# Patient Record
Sex: MALE | Race: WHITE | NOT HISPANIC OR LATINO | URBAN - METROPOLITAN AREA
[De-identification: names, ages, dates, MRNs, and addresses within clinical notes are randomized per-mention and may not be internally consistent; named-entity substitution may affect disease eponyms.]

---

## 2025-05-12 ENCOUNTER — OFFICE VISIT (OUTPATIENT)
Dept: URGENT CARE | Age: 60
End: 2025-05-12
Payer: COMMERCIAL

## 2025-05-12 VITALS
OXYGEN SATURATION: 93 % | HEART RATE: 77 BPM | SYSTOLIC BLOOD PRESSURE: 133 MMHG | TEMPERATURE: 98.4 F | DIASTOLIC BLOOD PRESSURE: 78 MMHG | RESPIRATION RATE: 17 BRPM

## 2025-05-12 DIAGNOSIS — J02.9 SORE THROAT: ICD-10-CM

## 2025-05-12 LAB
POC HUMAN RHINOVIRUS PCR: NEGATIVE
POC INFLUENZA A VIRUS PCR: NEGATIVE
POC INFLUENZA B VIRUS PCR: NEGATIVE
POC RESPIRATORY SYNCYTIAL VIRUS PCR: NEGATIVE
POC STREPTOCOCCUS PYOGENES (GROUP A STREP) PCR: NEGATIVE

## 2025-05-12 PROCEDURE — 87651 STREP A DNA AMP PROBE: CPT

## 2025-05-12 PROCEDURE — 87631 RESP VIRUS 3-5 TARGETS: CPT

## 2025-05-12 PROCEDURE — 99203 OFFICE O/P NEW LOW 30 MIN: CPT

## 2025-05-12 PROCEDURE — 99070 SPECIAL SUPPLIES PHYS/QHP: CPT

## 2025-05-12 RX ORDER — PREDNISONE 20 MG/1
40 TABLET ORAL DAILY
Qty: 10 TABLET | Refills: 0 | Status: SHIPPED | OUTPATIENT
Start: 2025-05-12 | End: 2025-05-17

## 2025-05-12 ASSESSMENT — ENCOUNTER SYMPTOMS: SORE THROAT: 1

## 2025-05-12 NOTE — PATIENT INSTRUCTIONS
You were seen at Urgent Care today for sore throat. Please treat as discussed. Please take medications as prescribed. Monitor for red flags which we spoke about, If your symptoms change, worsen or become concerning in any way, please go to the emergency room immediately, otherwise you can followup with your PCP in 2-3 days as needed

## 2025-05-12 NOTE — PROGRESS NOTES
Subjective   Patient ID: Gennadi Telerman is a 59 y.o. male. They present today with a chief complaint of Sore Throat (Sore throat x 1 week.).    History of Present Illness  Patient is a 59-year-old male with history of hypertension presents urgent care today with a complaint of sore throat.  He states his symptoms started approximately 1 week ago.  He has been taking Advil without significant relief.  He denies any fevers, difficulty swallowing, shortness of breath or facial swelling.  No other complaints or concerns at this time.      History provided by:  Patient  Sore Throat         Past Medical History  Allergies as of 05/12/2025    (No Known Allergies)       Prescriptions Prior to Admission[1]       Medical History[2]    Surgical History[3]         Review of Systems  Review of Systems   HENT:  Positive for sore throat.                                   Objective    Vitals:    05/12/25 1618   BP: 133/78   BP Location: Left arm   Patient Position: Sitting   BP Cuff Size: Large adult   Pulse: 77   Resp: 17   Temp: 36.9 °C (98.4 °F)   TempSrc: Oral   SpO2: 93%     No LMP for male patient.    Physical Exam  Vitals and nursing note reviewed.   Constitutional:       General: He is not in acute distress.     Appearance: Normal appearance. He is not ill-appearing, toxic-appearing or diaphoretic.   HENT:      Head: Normocephalic and atraumatic.      Mouth/Throat:      Lips: Pink.      Mouth: Mucous membranes are moist. Injury and lacerations present. No oral lesions or angioedema.      Tongue: No lesions. Tongue does not deviate from midline.      Palate: No mass and lesions.      Pharynx: Oropharynx is clear. Uvula midline. Posterior oropharyngeal erythema and postnasal drip present. No oropharyngeal exudate or uvula swelling.      Tonsils: No tonsillar exudate or tonsillar abscesses.   Eyes:      Extraocular Movements: Extraocular movements intact.      Conjunctiva/sclera: Conjunctivae normal.      Pupils: Pupils are  equal, round, and reactive to light.   Cardiovascular:      Rate and Rhythm: Normal rate and regular rhythm.      Pulses: Normal pulses.      Heart sounds: Normal heart sounds.   Pulmonary:      Effort: Pulmonary effort is normal. No respiratory distress.      Breath sounds: Normal breath sounds. No stridor. No wheezing, rhonchi or rales.   Chest:      Chest wall: No tenderness.   Musculoskeletal:         General: Normal range of motion.      Cervical back: Normal range of motion and neck supple.   Skin:     General: Skin is warm and dry.      Capillary Refill: Capillary refill takes less than 2 seconds.   Neurological:      General: No focal deficit present.      Mental Status: He is alert and oriented to person, place, and time.   Psychiatric:         Mood and Affect: Mood normal.         Behavior: Behavior normal.         Procedures      Assessment/Plan   Allergies, medications, history, and pertinent labs/EKGs/Imaging reviewed by MARY Bryan.     Medical Decision Making    Patient is well appearing, afebrile, non toxic, not hypoxic, and appropriate for outpatient treatment and management at time of evaluation. Patient presents with ongoing sore throat x 1 week.     Differential includes but not limited to: Streptococcal pharyngitis, viral pharyngitis, postnasal drip, other    On exam, patient has bilateral erythema of the oropharynx without appreciable tonsillar swelling or exudate.  No clinical signs of peritonsillar abscess or Gilbert degenerative.  No appreciable cervical adenopathy.  He is afebrile and appears well-hydrated.  Vitals within normal limits.  Oxygen saturation on room air is 94%.      Strep PCR is negative.  Rapid rhinovirus, RSV and influenza are still negative.  Suspect viral etiology.    Patient provided with a prescription for prednisone to use as directed.  Also recommended continued use of over-the-counter medication as needed for symptom relief.    Counseling: Spoke with the  patient and discussed today´s findings, in addition to providing specific details for the plan of care and expected course.  Patient was given the opportunity to ask questions.     Discussed return precautions and importance of follow-up. Advised to follow-up with PCP.  We spoke at length regarding the red flags he/she should be aware of and monitor for.  I specifically advised to go to the ED for changing or worsening symptoms, new symptoms, complaint specific precautions, and precautions listed on the discharge paperwork.    The plan of care was mutually agreed upon with the patient. All questions and concerns were answered to the best of my ability with today's information.  Patient was discharged in stable condition.    Dictation software was used in the creation of this note which does not evaluate or correct for typographical, spelling, syntax or grammatical errors.    Orders and Diagnoses  Diagnoses and all orders for this visit:  Sore throat  -     POCT SPOTFIRE R/ST Panel Mini w/Strep A ([facility]) manually resulted  -     predniSONE (Deltasone) 20 mg tablet; Take 2 tablets (40 mg) by mouth once daily for 5 days.      Medical Admin Record      Follow Up Instructions  No follow-ups on file.    Patient disposition: Home    Electronically signed by MARY Bryan  4:38 PM       [1] (Not in a hospital admission)  [2]   Past Medical History:  Diagnosis Date    Other conditions influencing health status 11/02/2017    History of cough   [3] No past surgical history on file.

## 2025-05-13 ENCOUNTER — TELEPHONE (OUTPATIENT)
Dept: URGENT CARE | Age: 60
End: 2025-05-13

## 2025-05-15 ENCOUNTER — HOSPITAL ENCOUNTER (EMERGENCY)
Facility: HOSPITAL | Age: 60
Discharge: HOME | End: 2025-05-16
Payer: COMMERCIAL

## 2025-05-15 ENCOUNTER — APPOINTMENT (OUTPATIENT)
Dept: RADIOLOGY | Facility: HOSPITAL | Age: 60
End: 2025-05-15
Payer: COMMERCIAL

## 2025-05-15 DIAGNOSIS — J02.9 PHARYNGITIS, UNSPECIFIED ETIOLOGY: Primary | ICD-10-CM

## 2025-05-15 DIAGNOSIS — R22.0 TONGUE SWELLING: ICD-10-CM

## 2025-05-15 PROBLEM — I10 HTN (HYPERTENSION): Status: ACTIVE | Noted: 2025-05-15

## 2025-05-15 PROBLEM — N40.1 BENIGN PROSTATIC HYPERPLASIA WITH LOWER URINARY TRACT SYMPTOMS: Status: ACTIVE | Noted: 2023-06-21

## 2025-05-15 PROBLEM — R35.1 BENIGN PROSTATIC HYPERPLASIA WITH NOCTURIA: Status: ACTIVE | Noted: 2025-05-15

## 2025-05-15 PROBLEM — E66.811 OBESITY, CLASS I, BMI 30-34.9: Status: ACTIVE | Noted: 2025-03-08

## 2025-05-15 PROBLEM — M79.2 NEURALGIA AND NEURITIS: Status: ACTIVE | Noted: 2025-05-15

## 2025-05-15 PROBLEM — G50.9 TRIGEMINAL NEUROPATHY: Status: ACTIVE | Noted: 2025-05-15

## 2025-05-15 PROBLEM — K62.5 RECTAL BLEEDING: Status: ACTIVE | Noted: 2025-05-15

## 2025-05-15 PROBLEM — R53.83 FATIGUE: Status: ACTIVE | Noted: 2025-05-15

## 2025-05-15 PROBLEM — R97.20 HIGH PROSTATE SPECIFIC ANTIGEN (PSA): Status: ACTIVE | Noted: 2023-06-21

## 2025-05-15 PROBLEM — N32.89 SPASTIC BLADDER: Status: ACTIVE | Noted: 2025-05-15

## 2025-05-15 PROBLEM — R14.0 ABDOMINAL BLOATING: Status: ACTIVE | Noted: 2025-05-15

## 2025-05-15 PROBLEM — R31.0 GROSS HEMATURIA: Status: ACTIVE | Noted: 2017-08-10

## 2025-05-15 PROBLEM — Z86.0100 HISTORY OF COLONIC POLYPS: Status: ACTIVE | Noted: 2025-05-15

## 2025-05-15 PROBLEM — J01.30 ACUTE NON-RECURRENT SPHENOIDAL SINUSITIS: Status: ACTIVE | Noted: 2025-05-15

## 2025-05-15 PROBLEM — N40.1 BENIGN PROSTATIC HYPERPLASIA WITH NOCTURIA: Status: ACTIVE | Noted: 2025-05-15

## 2025-05-15 PROBLEM — K64.4 EXTERNAL HEMORRHOID: Status: ACTIVE | Noted: 2017-08-10

## 2025-05-15 PROBLEM — G47.20 CIRCADIAN RHYTHM SLEEP DISORDER: Status: ACTIVE | Noted: 2025-05-15

## 2025-05-15 PROBLEM — E11.9 TYPE 2 DIABETES MELLITUS: Status: ACTIVE | Noted: 2025-05-15

## 2025-05-15 PROBLEM — N42.89 PROSTATE MASS: Status: ACTIVE | Noted: 2023-06-21

## 2025-05-15 LAB
ALBUMIN SERPL BCP-MCNC: 4.7 G/DL (ref 3.4–5)
ALP SERPL-CCNC: 48 U/L (ref 33–136)
ALT SERPL W P-5'-P-CCNC: 27 U/L (ref 10–52)
ANION GAP SERPL CALC-SCNC: 11 MMOL/L (ref 10–20)
AST SERPL W P-5'-P-CCNC: 22 U/L (ref 9–39)
BASOPHILS # BLD AUTO: 0.03 X10*3/UL (ref 0–0.1)
BASOPHILS NFR BLD AUTO: 0.2 %
BILIRUB SERPL-MCNC: 0.5 MG/DL (ref 0–1.2)
BUN SERPL-MCNC: 24 MG/DL (ref 6–23)
CALCIUM SERPL-MCNC: 9.5 MG/DL (ref 8.6–10.3)
CHLORIDE SERPL-SCNC: 106 MMOL/L (ref 98–107)
CO2 SERPL-SCNC: 26 MMOL/L (ref 21–32)
CREAT SERPL-MCNC: 1.1 MG/DL (ref 0.5–1.3)
EGFRCR SERPLBLD CKD-EPI 2021: 76 ML/MIN/1.73M*2
EOSINOPHIL # BLD AUTO: 0.31 X10*3/UL (ref 0–0.7)
EOSINOPHIL NFR BLD AUTO: 2.4 %
ERYTHROCYTE [DISTWIDTH] IN BLOOD BY AUTOMATED COUNT: 16 % (ref 11.5–14.5)
FLUAV RNA RESP QL NAA+PROBE: NOT DETECTED
FLUBV RNA RESP QL NAA+PROBE: NOT DETECTED
GLUCOSE SERPL-MCNC: 92 MG/DL (ref 74–99)
HCT VFR BLD AUTO: 43.8 % (ref 41–52)
HGB BLD-MCNC: 13.9 G/DL (ref 13.5–17.5)
IMM GRANULOCYTES # BLD AUTO: 0.06 X10*3/UL (ref 0–0.7)
IMM GRANULOCYTES NFR BLD AUTO: 0.5 % (ref 0–0.9)
LYMPHOCYTES # BLD AUTO: 3.73 X10*3/UL (ref 1.2–4.8)
LYMPHOCYTES NFR BLD AUTO: 29.4 %
MCH RBC QN AUTO: 23.2 PG (ref 26–34)
MCHC RBC AUTO-ENTMCNC: 31.7 G/DL (ref 32–36)
MCV RBC AUTO: 73 FL (ref 80–100)
MONOCYTES # BLD AUTO: 1.23 X10*3/UL (ref 0.1–1)
MONOCYTES NFR BLD AUTO: 9.7 %
NEUTROPHILS # BLD AUTO: 7.34 X10*3/UL (ref 1.2–7.7)
NEUTROPHILS NFR BLD AUTO: 57.8 %
NRBC BLD-RTO: 0 /100 WBCS (ref 0–0)
PLATELET # BLD AUTO: 349 X10*3/UL (ref 150–450)
POTASSIUM SERPL-SCNC: 4 MMOL/L (ref 3.5–5.3)
PROT SERPL-MCNC: 6.9 G/DL (ref 6.4–8.2)
RBC # BLD AUTO: 5.99 X10*6/UL (ref 4.5–5.9)
RSV RNA RESP QL NAA+PROBE: NOT DETECTED
S PYO DNA THROAT QL NAA+PROBE: NOT DETECTED
SARS-COV-2 RNA RESP QL NAA+PROBE: NOT DETECTED
SODIUM SERPL-SCNC: 139 MMOL/L (ref 136–145)
WBC # BLD AUTO: 12.7 X10*3/UL (ref 4.4–11.3)

## 2025-05-15 PROCEDURE — 99285 EMERGENCY DEPT VISIT HI MDM: CPT | Mod: 25

## 2025-05-15 PROCEDURE — 80053 COMPREHEN METABOLIC PANEL: CPT

## 2025-05-15 PROCEDURE — 2500000004 HC RX 250 GENERAL PHARMACY W/ HCPCS (ALT 636 FOR OP/ED): Mod: JZ | Performed by: PHYSICIAN ASSISTANT

## 2025-05-15 PROCEDURE — 70491 CT SOFT TISSUE NECK W/DYE: CPT

## 2025-05-15 PROCEDURE — 87651 STREP A DNA AMP PROBE: CPT

## 2025-05-15 PROCEDURE — 2500000001 HC RX 250 WO HCPCS SELF ADMINISTERED DRUGS (ALT 637 FOR MEDICARE OP): Performed by: PHYSICIAN ASSISTANT

## 2025-05-15 PROCEDURE — 2550000001 HC RX 255 CONTRASTS

## 2025-05-15 PROCEDURE — 87637 SARSCOV2&INF A&B&RSV AMP PRB: CPT

## 2025-05-15 PROCEDURE — 96365 THER/PROPH/DIAG IV INF INIT: CPT

## 2025-05-15 PROCEDURE — 96375 TX/PRO/DX INJ NEW DRUG ADDON: CPT | Mod: 59

## 2025-05-15 PROCEDURE — 85025 COMPLETE CBC W/AUTO DIFF WBC: CPT

## 2025-05-15 PROCEDURE — 36415 COLL VENOUS BLD VENIPUNCTURE: CPT

## 2025-05-15 PROCEDURE — 70491 CT SOFT TISSUE NECK W/DYE: CPT | Performed by: STUDENT IN AN ORGANIZED HEALTH CARE EDUCATION/TRAINING PROGRAM

## 2025-05-15 RX ORDER — ONDANSETRON HYDROCHLORIDE 2 MG/ML
4 INJECTION, SOLUTION INTRAVENOUS ONCE
Status: COMPLETED | OUTPATIENT
Start: 2025-05-15 | End: 2025-05-15

## 2025-05-15 RX ORDER — OXYCODONE HCL 5 MG/5 ML
5 SOLUTION, ORAL ORAL EVERY 6 HOURS PRN
Qty: 60 ML | Refills: 0 | Status: SHIPPED | OUTPATIENT
Start: 2025-05-15 | End: 2025-05-18

## 2025-05-15 RX ORDER — DIPHENHYDRAMINE HCL 25 MG
25 CAPSULE ORAL ONCE
Status: COMPLETED | OUTPATIENT
Start: 2025-05-15 | End: 2025-05-15

## 2025-05-15 RX ORDER — CEFTRIAXONE 1 G/50ML
1 INJECTION, SOLUTION INTRAVENOUS ONCE
Status: COMPLETED | OUTPATIENT
Start: 2025-05-15 | End: 2025-05-15

## 2025-05-15 RX ORDER — MORPHINE SULFATE 4 MG/ML
4 INJECTION, SOLUTION INTRAMUSCULAR; INTRAVENOUS ONCE
Status: COMPLETED | OUTPATIENT
Start: 2025-05-15 | End: 2025-05-15

## 2025-05-15 RX ORDER — KETOROLAC TROMETHAMINE 30 MG/ML
15 INJECTION, SOLUTION INTRAMUSCULAR; INTRAVENOUS ONCE
Status: COMPLETED | OUTPATIENT
Start: 2025-05-15 | End: 2025-05-15

## 2025-05-15 RX ORDER — SODIUM CHLORIDE 9 MG/ML
0-150 INJECTION, SOLUTION INTRAVENOUS AS NEEDED
Status: DISCONTINUED | OUTPATIENT
Start: 2025-05-15 | End: 2025-05-16 | Stop reason: HOSPADM

## 2025-05-15 RX ADMIN — KETOROLAC TROMETHAMINE 15 MG: 30 INJECTION, SOLUTION INTRAMUSCULAR at 22:28

## 2025-05-15 RX ADMIN — ONDANSETRON 4 MG: 2 INJECTION, SOLUTION INTRAMUSCULAR; INTRAVENOUS at 22:28

## 2025-05-15 RX ADMIN — DIPHENHYDRAMINE HYDROCHLORIDE 25 MG: 25 CAPSULE ORAL at 22:28

## 2025-05-15 RX ADMIN — CEFTRIAXONE 1 G: 1 INJECTION, SOLUTION INTRAVENOUS at 22:27

## 2025-05-15 RX ADMIN — MORPHINE SULFATE 4 MG: 4 INJECTION, SOLUTION INTRAMUSCULAR; INTRAVENOUS at 22:30

## 2025-05-15 RX ADMIN — SODIUM CHLORIDE 1000 ML: 0.9 INJECTION, SOLUTION INTRAVENOUS at 22:27

## 2025-05-15 RX ADMIN — IOHEXOL 75 ML: 350 INJECTION, SOLUTION INTRAVENOUS at 20:24

## 2025-05-15 ASSESSMENT — PAIN - FUNCTIONAL ASSESSMENT: PAIN_FUNCTIONAL_ASSESSMENT: 0-10

## 2025-05-15 ASSESSMENT — PAIN DESCRIPTION - PAIN TYPE: TYPE: ACUTE PAIN

## 2025-05-15 ASSESSMENT — PAIN SCALES - GENERAL: PAINLEVEL_OUTOF10: 9

## 2025-05-15 ASSESSMENT — COLUMBIA-SUICIDE SEVERITY RATING SCALE - C-SSRS
6. HAVE YOU EVER DONE ANYTHING, STARTED TO DO ANYTHING, OR PREPARED TO DO ANYTHING TO END YOUR LIFE?: NO
2. HAVE YOU ACTUALLY HAD ANY THOUGHTS OF KILLING YOURSELF?: NO
1. IN THE PAST MONTH, HAVE YOU WISHED YOU WERE DEAD OR WISHED YOU COULD GO TO SLEEP AND NOT WAKE UP?: NO

## 2025-05-15 ASSESSMENT — PAIN DESCRIPTION - LOCATION: LOCATION: THROAT

## 2025-05-15 NOTE — Clinical Note
Maria Isabel Telerman was seen and treated in our emergency department on 5/15/2025.  He may return to work on 05/20/2025.       If you have any questions or concerns, please don't hesitate to call.      Cait Barbosa PA-C

## 2025-05-16 VITALS
SYSTOLIC BLOOD PRESSURE: 94 MMHG | RESPIRATION RATE: 20 BRPM | TEMPERATURE: 97.6 F | BODY MASS INDEX: 32.47 KG/M2 | HEART RATE: 60 BPM | DIASTOLIC BLOOD PRESSURE: 66 MMHG | HEIGHT: 73 IN | WEIGHT: 245 LBS | OXYGEN SATURATION: 97 %

## 2025-05-16 NOTE — DISCHARGE INSTRUCTIONS
Please continue cefdinir as previously prescribed.  Please continue Tylenol and/or ibuprofen as needed for pain.  Please stop taking Tylenol codeine.  May transition to Roxicodone.  Take this medication every 6 hours as needed for severe pain.  This is narcotic.  Do not drive, drink alcohol or operate heavy machinery while taking this medication.  Do not take other sedating medications when taking narcotics.  Narcotics can cause constipation.  Drink extra water and eat extra fiber to help prevent this. May take stool softners as needed.  May take Benadryl every 6 hours as needed for itching, tongue or lip swelling.  May take daily Claritin-D for the next few days.  Stop taking steroids.  Follow-up with primary care doctor  If you develop any new or worsening symptoms such as fever greater than 100.7 degrees Fahrenheit while on antibiotics, inability to swallow, worsening pain or swelling, low oxygen or anything else concerning to please go straight to [facility] ER where they have ENT coverage 24/7 and you can be seen by specialist and admitted if necessary.

## 2025-05-16 NOTE — ED PROVIDER NOTES
HPI   CC: Sore Throat     Patient is a 59-year-old male with benign PMH presenting to the ED with concern for a sore throat.  Symptoms started on Monday.  He has had a sore throat, hoarse voice, and tongue swelling.  Wife is providing history because it is too painful for pt to speak. Patient was seen at urgent care where they performed a point-of-care strep test which was negative.  Patient was given prednisone at that time for tongue swelling.  He has been taking the prednisone with no relief of symptoms.  On Wednesday patient went to his primary care who told him he does not know why he has a sore throat and gave him a prescription for cefdinir and Percocet.  Patient has been taking both those medications.  At that time patient was also given eardrops for possible left otitis externa which patient has not filled yet.  Symptoms continue to worsen and patient's PCP recommended he go to the ER to have a CT scan to assess for any abscesses.  He denies any fever, chills, drooling, nausea, vomiting, abdominal pain, diarrhea, neck pain/stiffness, chest pain, shortness of breath.  He has no known allergies or recent sick exposures.  Patient is of the pain is getting unbearable and it hurts to talk and he is unable to eat or drink secondary to pain.          ROS: 10-point review of systems was performed and is otherwise negative except as noted in HPI.    Limitations to history: N/A  Independent Historians: Self   External Records Reviewed: Outpatient notes in EMR    Past Medical History: Noncontributory except per HPI     Past Surgical History: Noncontributory except per HPI     Family History: Reviewed and noncontributory     Social History:  Denies tobacco. Denies ETOH. Denies illicit drugs.    RX Allergies[1]    Home Meds:   Current Outpatient Medications   Medication Instructions    oxyCODONE (ROXICODONE) 5 mg, oral, Every 6 hours PRN    predniSONE (DELTASONE) 40 mg, oral, Daily        Physical Exam     ED Triage  "Vitals [05/15/25 1824]   Temperature Heart Rate Respirations BP   36.4 °C (97.6 °F) 75 18 115/76      Pulse Ox Temp Source Heart Rate Source Patient Position   95 % Temporal Monitor --      BP Location FiO2 (%)     -- --         Vitals and nursing note reviewed.   Physical Exam  Constitutional:       General: He is not in acute distress.     Appearance: Normal appearance. He is not ill-appearing, toxic-appearing or diaphoretic.   HENT:      Head: Normocephalic and atraumatic.      Right Ear: Tympanic membrane and ear canal normal. No drainage, swelling or tenderness. No middle ear effusion. Tympanic membrane is not erythematous.      Left Ear: Tympanic membrane and ear canal normal. No drainage, swelling or tenderness.  No middle ear effusion. Tympanic membrane is not erythematous.      Nose: Nose normal. No congestion.      Mouth/Throat:      Mouth: Mucous membranes are moist.      Pharynx: Oropharynx is clear. Uvula midline. Posterior oropharyngeal erythema present. No oropharyngeal exudate.      Comments: No stridor, no trismus.  Patient is tolerating secretions without tripod positioning or drooling. Pt able to recite \"today is a addi day\" with no hot potato voice.   Eyes:      General: No scleral icterus.        Right eye: No discharge.         Left eye: No discharge.      Extraocular Movements: Extraocular movements intact.      Conjunctiva/sclera: Conjunctivae normal.      Pupils: Pupils are equal, round, and reactive to light.   Neck:      Comments: Patient has full range of motion to neck  Cardiovascular:      Rate and Rhythm: Normal rate and regular rhythm.      Heart sounds: Normal heart sounds. No murmur heard.     No friction rub. No gallop.   Pulmonary:      Effort: Pulmonary effort is normal. No respiratory distress.      Breath sounds: Normal breath sounds. No stridor. No wheezing, rhonchi or rales.   Abdominal:      General: Abdomen is flat. Bowel sounds are normal. There is no distension.      " Palpations: Abdomen is soft.      Tenderness: There is no abdominal tenderness. There is no right CVA tenderness, left CVA tenderness, guarding or rebound.   Musculoskeletal:         General: Normal range of motion.      Cervical back: Normal range of motion and neck supple. No rigidity or tenderness.   Lymphadenopathy:      Cervical: No cervical adenopathy.   Skin:     General: Skin is warm and dry.      Capillary Refill: Capillary refill takes less than 2 seconds.   Neurological:      General: No focal deficit present.      Mental Status: He is alert and oriented to person, place, and time.   Psychiatric:         Mood and Affect: Mood normal.         Behavior: Behavior normal.         Diagnostic Results      Labs Reviewed   CBC WITH AUTO DIFFERENTIAL - Abnormal       Result Value    WBC 12.7 (*)     nRBC 0.0      RBC 5.99 (*)     Hemoglobin 13.9      Hematocrit 43.8      MCV 73 (*)     MCH 23.2 (*)     MCHC 31.7 (*)     RDW 16.0 (*)     Platelets 349      Neutrophils % 57.8      Immature Granulocytes %, Automated 0.5      Lymphocytes % 29.4      Monocytes % 9.7      Eosinophils % 2.4      Basophils % 0.2      Neutrophils Absolute 7.34      Immature Granulocytes Absolute, Automated 0.06      Lymphocytes Absolute 3.73      Monocytes Absolute 1.23 (*)     Eosinophils Absolute 0.31      Basophils Absolute 0.03     COMPREHENSIVE METABOLIC PANEL - Abnormal    Glucose 92      Sodium 139      Potassium 4.0      Chloride 106      Bicarbonate 26      Anion Gap 11      Urea Nitrogen 24 (*)     Creatinine 1.10      eGFR 76      Calcium 9.5      Albumin 4.7      Alkaline Phosphatase 48      Total Protein 6.9      AST 22      Bilirubin, Total 0.5      ALT 27     GROUP A STREPTOCOCCUS, PCR - Normal    Group A Strep PCR Not Detected     SARS-COV-2, INFLUENZA A/B AND RSV PCR - Normal    Coronavirus 2019, PCR Not Detected      Flu A Result Not Detected      Flu B Result Not Detected      RSV PCR Not Detected      Narrative:      This assay is an FDA-cleared, in vitro diagnostic nucleic acid amplification test for the qualitative detection and differentiation of SARS CoV-2/ Influenza A/B/ RSV from nasopharyngeal specimens collected from individuals with signs and symptoms of respiratory tract infections, and has been validated for use at [facility]. Negative results do not preclude COVID-19/ Influenza A/B/ RSV infections and should not be used as the sole basis for diagnosis, treatment, or other management decisions. Testing for SARS CoV-2 is recommended only for patients who meet current clinical and/or epidemiological criteria defined by federal, state, or local public health directives.         CT soft tissue neck w IV contrast   Final Result   1. Evaluation of the oral cavity is significantly degraded by beam   hardening artifact from patient's dental hardware. Within limits of   the study, there is suggestion of some swelling/edema present in the   lips, which may represent component of angioedema with mild   prominence of the mucosal pharyngeal space in the oropharynx, which   may reflect component of pharyngitis, and further correlation with   physical exam is recommended. No associated lymphadenopathy, fluid   collections or fat stranding is present in the deep fat planes/spaces   of the neck.   2. Mild upper lung predominant paraseptal and centrilobular   emphysematous changes.   3. Multilevel degenerative changes of the cervical spine, with mild   spinal canal narrowing suspected at C5-C6 and C6-C7 due to disc   osteophyte complexes.        MACRO:   None        Signed by: Philip Zayas 5/15/2025 9:28 PM   Dictation workstation:   [id number]          ED Course & MDM   Assessment/Plan:   Medications   iohexol (OMNIPaque) 350 mg iodine/mL solution 75 mL (75 mL intravenous Given 5/15/25 2024)   sodium chloride 0.9 % bolus 1,000 mL (0 mL intravenous Stopped 5/15/25 2317)   cefTRIAXone (Rocephin) 1 g in  dextrose (iso) IV 50 mL (0 g intravenous Stopped 5/15/25 2317)   diphenhydrAMINE (BENADryl) capsule 25 mg (25 mg oral Given 5/15/25 2228)   morphine injection 4 mg (4 mg intravenous Given 5/15/25 2230)   ondansetron (Zofran) injection 4 mg (4 mg intravenous Given 5/15/25 2228)   ketorolac (Toradol) injection 15 mg (15 mg intravenous Given 5/15/25 2228)      ED Course as of 05/16/25 1925   Thu May 15, 2025   2054 Patient signed out to me by the SILVERIO Velez pending strep swab and soft tissue neck.  Patient presented to the ED today complaining of sore throat, tongue swelling.  Physical exam not concerning for ludwigs.  Urgent care earlier this week had rapid strep negative. Sent home on prednisone. Wed went to PCP. Gave cefdinir and percocet and drops for outer ear infection. Pcp said come to ED for imaging if symptoms do not improve.  [KA]   2144 IMPRESSION:  1. Evaluation of the oral cavity is significantly degraded by beam  hardening artifact from patient's dental hardware. Within limits of  the study, there is suggestion of some swelling/edema present in the  lips, which may represent component of angioedema with mild  prominence of the mucosal pharyngeal space in the oropharynx, which  may reflect component of pharyngitis, and further correlation with  physical exam is recommended. No associated lymphadenopathy, fluid  collections or fat stranding is present in the deep fat planes/spaces  of the neck.  2. Mild upper lung predominant paraseptal and centrilobular  emphysematous changes.  3. Multilevel degenerative changes of the cervical spine, with mild  spinal canal narrowing suspected at C5-C6 and C6-C7 due to disc  osteophyte complexes.   [KA]   2145 I personally viewed labs.  CBC shows leukocytosis WBCs 12.7.  Normal hemoglobin and no left shift.  CMP shows normal electrolytes, slightly elevated BUN otherwise normal renal function, normal LFTs.  COVID flu and strep negative. [KA]   2206 On reassessment  patient still not talking.  Wife is providing history.  She says it is too painful for him to talk.  Complaining of tongue swelling.  On my exam oropharynx is patent.  Do not appreciate any Gilbert's angina.  Tongue appears normal.  Lips appear normal.  No evidence of angioedema.  He does not take lisinopril or other ACE.  He said the symptoms started before he took the Tylenol with codeine and he has taken Tylenol with codeine in the past without similar symptoms.  He is requesting medication for pain.  Will give 4 mg of IV morphine, 15 mg IV Toradol and 4 mg IV Zofran.  Wife is concerned because he has not eaten and drinking a lot today and she thinks he may become dehydrated.  Will order 1 L normal saline.  He did not take his dose of antibiotics tonight because he was here and because he is having difficulty eating and he does not want to take his antibiotics on empty stomach.  Will give 1 dose of IV Rocephin in the ED.  Patient also to be given dose of p.o. Benadryl.  Will reassess. [KA]   2332 Walking pulse ox patient dropped to 92% on room air.  Improved to 95% on room air at rest. [KA]   2356 On reassessment, patient doing much better.  He is able to talk.  He said pain is improved.  He is now feeling hungry.  Reviewed OARRS with no concerning findings.  Will transition to liquid oxycodone advised not to take any more Tylenol with codeine.  Advised to continue antibiotics as previously prescribed.  Advised to stop taking steroids.  Recommended follow-up with PCP and returning to ED for any new or worsening symptoms.  Also recommended he speak to PCP about sleep study.  Patient and wife agreeable.  Provided with work note. [KA]   Fri May 16, 2025   1921 Patient is a 59-year-old male presenting to the ED with concern for sore throat and tongue swelling.  Vital signs are stable, patient is nontoxic-appearing.  On exam there is no obvious swelling in the oropharynx.  There is no trismus or tripoding.  Patient has  full range of motion to neck with no lymphadenopathy or signs of meningismus.  Patient states it is too painful for him to talk, however when requesting patient to speak he has normal phonation with no hot potato voice.  Given this is his third visit for the same complaint with negative viral and strep swabs in the past will obtain CT soft tissue neck to evaluate for any abscesses.  Blood work was obtained.  CBC with leukocytosis at 12.7, no signs of anemia, normal platelets.  CMP with no electrolyte abnormalities and normal kidney function.  BUN elevated at 24.  Swabs negative for COVID, flu, RSV, strep.  Patient was signed out to Berta Barbosa PA-C at 9 PM pending results of CT soft tissue neck imaging.  I anticipate patient will likely need antibiotics, however will defer to Berta Barbosa PA-C pending results of imaging.  Anticipate he may need IV fluids given the fact that he has not been able to eat or drink much over the past couple days. [VS]      ED Course User Index  [KA] Cait Barbosa PA-C  [VS] Art Franco PA-C         Diagnoses as of 05/16/25 1925   Pharyngitis, unspecified etiology   Tongue swelling       ED Prescriptions       Medication Sig Dispense Start Date End Date Auth. Provider    oxyCODONE (Roxicodone) 5 mg/5 mL solution Take 5 mL (5 mg) by mouth every 6 hours if needed for severe pain (7 - 10) for up to 3 days. 60 mL 5/15/2025 5/18/2025 Cait Barbosa PA-C            Chronic Medical Conditions Significantly Affecting Care:      Escalation of Care: Appropriate for outpatient management    Counseling: Spoke with the patient and discussed today´s findings, in addition to providing specific details for the plan of care and expected course.  Patient was given the opportunity to ask questions.    Discussed return precautions and importance of follow-up.  Advised to follow-up with PCP.  Advised to return to the ED for changing or worsening symptoms, new symptoms, complaint specific  precautions, and precautions listed on the discharge paperwork.  Educated on the common potential side effects of medications prescribed.    I advised the patient that the emergency evaluation and treatment provided today doesn't end their need for medical care. It is very important that they follow-up with their primary care provider or other specialist as instructed.    The plan of care was mutually agreed upon with the patient. The patient and/or family were given the opportunity to ask questions. All questions asked today in the ED were answered to the best of my ability with today's information.    I specifically advised the patient to return to the ED for changing or worsening symptoms, worrisome new symptoms, or for any complaint specific precautions listed on the discharge paperwork.    Procedure  Procedures         [1] No Known Allergies       Art Franco PA-C  05/16/25 1925

## 2025-05-19 ENCOUNTER — PHARMACY VISIT (OUTPATIENT)
Dept: PHARMACY | Facility: CLINIC | Age: 60
End: 2025-05-19
Payer: COMMERCIAL

## 2025-05-19 PROCEDURE — RXMED WILLOW AMBULATORY MEDICATION CHARGE
